# Patient Record
Sex: FEMALE | Race: OTHER | HISPANIC OR LATINO
[De-identification: names, ages, dates, MRNs, and addresses within clinical notes are randomized per-mention and may not be internally consistent; named-entity substitution may affect disease eponyms.]

---

## 2018-05-24 PROBLEM — Z00.00 ENCOUNTER FOR PREVENTIVE HEALTH EXAMINATION: Status: ACTIVE | Noted: 2018-05-24

## 2020-11-12 ENCOUNTER — TRANSCRIPTION ENCOUNTER (OUTPATIENT)
Age: 35
End: 2020-11-12

## 2020-11-12 ENCOUNTER — APPOINTMENT (OUTPATIENT)
Dept: ORTHOPEDIC SURGERY | Facility: CLINIC | Age: 35
End: 2020-11-12
Payer: MEDICAID

## 2020-11-12 VITALS — DIASTOLIC BLOOD PRESSURE: 58 MMHG | HEART RATE: 77 BPM | SYSTOLIC BLOOD PRESSURE: 103 MMHG

## 2020-11-12 VITALS — HEIGHT: 61 IN | WEIGHT: 125 LBS | BODY MASS INDEX: 23.6 KG/M2

## 2020-11-12 DIAGNOSIS — S89.91XA UNSPECIFIED INJURY OF RIGHT LOWER LEG, INITIAL ENCOUNTER: ICD-10-CM

## 2020-11-12 PROCEDURE — 73564 X-RAY EXAM KNEE 4 OR MORE: CPT | Mod: RT

## 2020-11-12 PROCEDURE — 99072 ADDL SUPL MATRL&STAF TM PHE: CPT

## 2020-11-12 PROCEDURE — 99204 OFFICE O/P NEW MOD 45 MIN: CPT

## 2020-11-12 NOTE — HISTORY OF PRESENT ILLNESS
[de-identified] : Anita is a 35F who presents with right knee pain. The pain began on 11/10 after she tripped while descending a flight of stairs.  She felt a pop and was unable to bear weight immediately after.  She was seen in an urgent care facility where xrays were reportedly negative.  She initially had swelling, but has been icing and wrapping the knee and the swelling has decreased.  Pain is worse with walking, better with elevation.  Reports sensation of instabilty.  Denies locking. No prior knee injury.

## 2020-11-12 NOTE — DISCUSSION/SUMMARY
[de-identified] : Discussed findings of today's exam and possible causes of patient's pain.  Educated patient on their most probable diagnosis of MCL tear with possibility of meniscal injury.  Reviewed possible courses of treatment, and we collaboratively decided best course of treatment at this time will include use of a brace and MRI.  Once the MRI is done, we will review to discuss further treatment.\par \par Shefali Whitfield MD, EdM\par [facility]\par Department of Orthopedics\par \par

## 2020-11-12 NOTE — PHYSICAL EXAM
[de-identified] : Constitutional: Well-nourished, well-developed, No acute distress\par Respiratory:  Good respiratory effort, no SOB\par Lymphatic: No regional lymphadenopathy, no lymphedema\par Psychiatric: Pleasant and normal affect, alert and oriented x3\par Skin: Clean dry and intact B/L UE/LE\par Musculoskeletal: normal except where as noted in regional exam\par \par \par right Knee:\par APPEARANCE: no marked deformities, mild effusion\par POSITIVE TENDERNESS:  medial joint, MCL, lateral joint\par ROM: lacks 10 degrees ext, 30 deg flexion\par SPECIAL TESTS: +laxity with valgus stress,. painless grind. neg Lachman's. neg ant/post drawer. + Richard's. \par NEURO: Normal sensation of LE, DTRs 2+/4 patella and achilles\par PULSES: 2+ DP/PT pulses\par B/L Hips: No asymmetry, malalignment, or swelling, Full ROM, 5/5 strength in flexion/ext, IR/ER, Abd/Add, Joints stable\par B/L Ankles: No asymmetry, malalignment, or swelling, Full ROM, 5/5 strength in DF/PF/Inv/Ev, Joints stable\par \par \par  [de-identified] : \par The following radiographs were ordered and read by me during this patient's visit. I reviewed each radiograph in detail with the patient and discussed the findings as highlighted below. \par \par 4 views of the right knee were obtained today that show no fracture, or dislocation. There are no degenerative changes seen. There is no malalignment. No obvious osseous abnormality. Otherwise unremarkable.\par

## 2020-11-18 ENCOUNTER — APPOINTMENT (OUTPATIENT)
Dept: MRI IMAGING | Facility: CLINIC | Age: 35
End: 2020-11-18
Payer: MEDICAID

## 2020-11-18 ENCOUNTER — OUTPATIENT (OUTPATIENT)
Dept: OUTPATIENT SERVICES | Facility: HOSPITAL | Age: 35
LOS: 1 days | End: 2020-11-18
Payer: MEDICAID

## 2020-11-18 DIAGNOSIS — S89.91XA UNSPECIFIED INJURY OF RIGHT LOWER LEG, INITIAL ENCOUNTER: ICD-10-CM

## 2020-11-18 PROCEDURE — 73721 MRI JNT OF LWR EXTRE W/O DYE: CPT | Mod: 26,RT

## 2020-11-18 PROCEDURE — 73721 MRI JNT OF LWR EXTRE W/O DYE: CPT

## 2020-11-19 ENCOUNTER — APPOINTMENT (OUTPATIENT)
Dept: ORTHOPEDIC SURGERY | Facility: CLINIC | Age: 35
End: 2020-11-19
Payer: MEDICAID

## 2020-11-19 PROCEDURE — 99442: CPT

## 2020-12-04 ENCOUNTER — APPOINTMENT (OUTPATIENT)
Dept: ORTHOPEDIC SURGERY | Facility: CLINIC | Age: 35
End: 2020-12-04
Payer: MEDICAID

## 2020-12-04 VITALS
HEIGHT: 61 IN | DIASTOLIC BLOOD PRESSURE: 85 MMHG | SYSTOLIC BLOOD PRESSURE: 121 MMHG | HEART RATE: 75 BPM | BODY MASS INDEX: 23.6 KG/M2 | OXYGEN SATURATION: 97 % | WEIGHT: 125 LBS

## 2020-12-04 PROCEDURE — 99072 ADDL SUPL MATRL&STAF TM PHE: CPT

## 2020-12-04 PROCEDURE — 99214 OFFICE O/P EST MOD 30 MIN: CPT

## 2020-12-04 RX ORDER — DICLOFENAC SODIUM 50 MG/1
50 TABLET, DELAYED RELEASE ORAL TWICE DAILY
Qty: 60 | Refills: 0 | Status: ACTIVE | COMMUNITY
Start: 2020-12-04 | End: 1900-01-01

## 2020-12-04 NOTE — PHYSICAL EXAM
[de-identified] : Physical Examination\par General: well nourished, in no acute distress, alert and oriented to person, place and time\par Psychiatric: normal mood and affect, no abnormal movements or speech patterns\par Eyes: vision intact - glasses\par Throat: no thyromegaly\par Lymph: no enlarged nodes, no lymphedema in extremity\par Respiratory: no wheezing, no shortness of breath with ambulation\par Cardiac: no cardiac leg swelling, 2+ peripheral pulses\par Neurology: normal gross sensation in extremities to light touch\par Abdomen: soft, non-tender, tympanic, no masses\par \par Musculoskeletal Examination\par Ambulation	+ antalgic gait, - assistive devices\par \par Knee			Right			Left\par General\par      Swelling/Deformity	normal			normal	\par      Skin			normal			normal\par      Erythema		-			-\par      Standing Alignment	neutral			neutral\par      Effusion		small			none\par Range of Motion\par      Hip			full painless ROM		full painless ROM\par      Knee Flexion		130			130\par      Knee Extension	0			0\par Patella\par      J Sign		-			-\par      Quad Medial/Lateral	1/1 1/1\par      Apprehension		-			-\par      Juan Pablo's		-			-\par      Grind Sign		-			-\par      Crepitus		-			-\par Palpation\par      Medial Joint Line	-			-\par      Medial Fem Condyle	+ epicondyle			-\par      Lateral Joint Line	-			-\par      Quad Tendon		-			-\par      Patella Tendon	-			-\par      Medial Patella		-			-\par      Lateral Patella 	-			-\par      Posterior Knee	-			-\par Ligamentous\par      Varus @ 0° / 30°	-/-			-/-\par      Valgus @ 0° / 30°	-/-			-/-\par      Lachman		+			-\par      Pivot Shift		+			-\par      Anterior Drawer	-			-\par      Posterior Drawer	-			-\par Meniscus\par      Joan		-			-\par      Flexion Pinch		-			-\par Strength Examination/Atrophy\par      Hip Flexors 		5+			5+\par      Quadriceps		5+			5+\par      Hamstring		5+			5+\par      Tibialis Anterior	5+			5+\par      Achilles/Soleus	5+			5+\par Sensation\par      Deep Peroneal	normal			normal\par      Superficial Peroneal 	normal			normal\par      Sural  		normal			normal\par      Posterior Tibial 	normal			normal\par      Saphneous 		normal			normal\par Pulses\par      DP			2+			2+\par  [de-identified] : \par MRI Right knee from [facility] on 11-19-20\par My impression of the images:\par Quality of the MRI is ok\par Medial Meniscus ok\par Lateral Meniscus ok\par There is no chondral loss in the tri compartments\par There is bone marrow edema in the lateral and medial compartments\par LCL [Is] intact\par MCL [Is] intact, signal proximal origin\par ACL is not intact, torn proximal origin\par PCL [Is] intact \par Quadriceps Tendon [Is] intact\par Patella Tendon [Is] intact\par \par The Final Radiologist Impression:\par CRUCIATE AND COLLATERAL LIGAMENTS: There is a full-thickness tear of the anterior cruciate ligament. The remnant fibers are ill-defined and amorphus. There is periligamentous edema about the medial collateral ligament consistent with a mild sprain. No focal tear is demonstrated. Edema extends along the posterior medial capsule. The posterior oblique ligament is edematous and amorphus consistent with a moderate sprain. There is mild to moderate tendinosis of the semimembranosus insertion. The lateral collateral ligamentous structures are intact.\par \par MEDIAL COMPARTMENT: The medial meniscus is intact. Preserved articular cartilage.\par \par LATERAL COMPARTMENT: The lateral meniscus is intact. Preserved articular cartilage.\par \par PATELLOFEMORAL COMPARTMENT: Mild superficial fissuring of the articular cartilage of the lateral facet of the patella and of the trochlea. The patella demonstrates slight lateral deviation.\par \par EXTENSOR MECHANISM: Patellar and quadriceps tendons are intact.\par \par SYNOVIUM/ JOINT FLUID: Small knee effusion. A small amount of fluid extends into the gastrocnemius and semimembranosus bursa where there is seepage of fluid into the surrounding myofascial planes.\par \par BONE MARROW: There is a prominent osseous contusion involving the posterior aspect lateral tibial plateau. No reciprocal contusion is seen along the lateral femoral condyle..\par \par MUSCLES: Within normal limits.\par \par NEUROVASCULAR STRUCTURES: Intact.\par \par SUBCUTANEOUS SOFT TISSUES: Within normal limits.\par \par IMPRESSION:\par 1. Full-thickness tear of the anterior cruciate ligament.\par 2. Prominent contusion of the lateral tibial plateau.\par 3. Mild sprain of the medial collateral ligament without focal discontinuity. Moderate sprain of the posterior oblique ligament along the posterior medial joint capsule.\par \par

## 2020-12-04 NOTE — DISCUSSION/SUMMARY
[de-identified] : Rt knee ACL rupture\par \par A lengthy discussion was carried out with the patient regarding my findings. I have reviewed the MRI and report again with the patient. I have again discussed the treatment options. Our plan is to proceed with surgical arthroscopy of the left knee with anterior cruciate ligament reconstruction, with tibialis allograft careful evaluation of any meniscal pathology with any partial meniscectomy versus meniscal repair, chondroplasty and debridement. I have explained to the patient the nature of the surgery as well as the perioperative recovery including the risks, benefits and alternatives. The risks, benefits and alternatives to surgery were discussed. The risks included, but were not limited to, infection, bleeding, damage to the vessels and nerves, loss of motion, continued pain, future tearing of the meniscus, deep venous thrombosis, RSD, avascular necrosis, development of degenerative arthrosis, rupture of the anterior cruciate ligament, and complications due to general and regional anesthesia including nerve injury, myocardial infarction, stroke and death. The patient stated understanding of the nature of the surgical procedure and gave written and verbal consent to proceed. The patient's questions were answered. \par \par will plan surgery dec 21 hamstring allograft\par \par fu 1-2 weeks postop

## 2020-12-04 NOTE — HISTORY OF PRESENT ILLNESS
[de-identified] : 35 year old female presents today for right ACL tear, diagnosed on MRI 11-19-20, referred from Dr. Whitfield. She tripped while descending a flight of stairs on 11-10-20.  She felt a pop and was unable to bear weight immediately after. She was seen in an urgent care facility where xrays were reportedly negative. She initially had swelling, but has been icing and wrapping the knee and the swelling has decreased. Pain is worse with walking, better with elevation. Reports sensation of instabilty. Denies locking. No prior knee injury. Pt would like to proceed with surgery at this time.

## 2020-12-17 ENCOUNTER — OUTPATIENT (OUTPATIENT)
Dept: OUTPATIENT SERVICES | Facility: HOSPITAL | Age: 35
LOS: 1 days | End: 2020-12-17
Payer: MEDICAID

## 2020-12-17 VITALS
DIASTOLIC BLOOD PRESSURE: 80 MMHG | OXYGEN SATURATION: 98 % | TEMPERATURE: 99 F | RESPIRATION RATE: 16 BRPM | HEART RATE: 80 BPM | SYSTOLIC BLOOD PRESSURE: 112 MMHG | HEIGHT: 62 IN | WEIGHT: 136.91 LBS

## 2020-12-17 DIAGNOSIS — S83.519A SPRAIN OF ANTERIOR CRUCIATE LIGAMENT OF UNSPECIFIED KNEE, INITIAL ENCOUNTER: ICD-10-CM

## 2020-12-17 DIAGNOSIS — S83.419A SPRAIN OF MEDIAL COLLATERAL LIGAMENT OF UNSPECIFIED KNEE, INITIAL ENCOUNTER: ICD-10-CM

## 2020-12-17 DIAGNOSIS — Z01.818 ENCOUNTER FOR OTHER PREPROCEDURAL EXAMINATION: ICD-10-CM

## 2020-12-17 DIAGNOSIS — Z98.890 OTHER SPECIFIED POSTPROCEDURAL STATES: Chronic | ICD-10-CM

## 2020-12-17 DIAGNOSIS — S83.511A SPRAIN OF ANTERIOR CRUCIATE LIGAMENT OF RIGHT KNEE, INITIAL ENCOUNTER: ICD-10-CM

## 2020-12-17 LAB
ANION GAP SERPL CALC-SCNC: 9 MMOL/L — SIGNIFICANT CHANGE UP (ref 5–17)
BUN SERPL-MCNC: 11 MG/DL — SIGNIFICANT CHANGE UP (ref 7–23)
CALCIUM SERPL-MCNC: 9.6 MG/DL — SIGNIFICANT CHANGE UP (ref 8.4–10.5)
CHLORIDE SERPL-SCNC: 104 MMOL/L — SIGNIFICANT CHANGE UP (ref 96–108)
CO2 SERPL-SCNC: 27 MMOL/L — SIGNIFICANT CHANGE UP (ref 22–31)
CREAT SERPL-MCNC: 0.86 MG/DL — SIGNIFICANT CHANGE UP (ref 0.5–1.3)
GLUCOSE SERPL-MCNC: 76 MG/DL — SIGNIFICANT CHANGE UP (ref 70–99)
HCT VFR BLD CALC: 39.7 % — SIGNIFICANT CHANGE UP (ref 34.5–45)
HGB BLD-MCNC: 13 G/DL — SIGNIFICANT CHANGE UP (ref 11.5–15.5)
MCHC RBC-ENTMCNC: 29.7 PG — SIGNIFICANT CHANGE UP (ref 27–34)
MCHC RBC-ENTMCNC: 32.7 GM/DL — SIGNIFICANT CHANGE UP (ref 32–36)
MCV RBC AUTO: 90.8 FL — SIGNIFICANT CHANGE UP (ref 80–100)
NRBC # BLD: 0 /100 WBCS — SIGNIFICANT CHANGE UP (ref 0–0)
PLATELET # BLD AUTO: 240 K/UL — SIGNIFICANT CHANGE UP (ref 150–400)
POTASSIUM SERPL-MCNC: 4 MMOL/L — SIGNIFICANT CHANGE UP (ref 3.5–5.3)
POTASSIUM SERPL-SCNC: 4 MMOL/L — SIGNIFICANT CHANGE UP (ref 3.5–5.3)
RBC # BLD: 4.37 M/UL — SIGNIFICANT CHANGE UP (ref 3.8–5.2)
RBC # FLD: 13.1 % — SIGNIFICANT CHANGE UP (ref 10.3–14.5)
SODIUM SERPL-SCNC: 140 MMOL/L — SIGNIFICANT CHANGE UP (ref 135–145)
WBC # BLD: 7.97 K/UL — SIGNIFICANT CHANGE UP (ref 3.8–10.5)
WBC # FLD AUTO: 7.97 K/UL — SIGNIFICANT CHANGE UP (ref 3.8–10.5)

## 2020-12-17 PROCEDURE — 80048 BASIC METABOLIC PNL TOTAL CA: CPT

## 2020-12-17 PROCEDURE — G0463: CPT

## 2020-12-17 PROCEDURE — 85027 COMPLETE CBC AUTOMATED: CPT

## 2020-12-17 RX ORDER — CHLORHEXIDINE GLUCONATE 213 G/1000ML
1 SOLUTION TOPICAL ONCE
Refills: 0 | Status: DISCONTINUED | OUTPATIENT
Start: 2020-12-21 | End: 2021-01-04

## 2020-12-17 RX ORDER — SODIUM CHLORIDE 9 MG/ML
3 INJECTION INTRAMUSCULAR; INTRAVENOUS; SUBCUTANEOUS EVERY 8 HOURS
Refills: 0 | Status: DISCONTINUED | OUTPATIENT
Start: 2020-12-21 | End: 2021-01-04

## 2020-12-17 RX ORDER — LIDOCAINE HCL 20 MG/ML
0.2 VIAL (ML) INJECTION ONCE
Refills: 0 | Status: DISCONTINUED | OUTPATIENT
Start: 2020-12-21 | End: 2021-01-04

## 2020-12-17 NOTE — H&P PST ADULT - NSANTHOSAYNRD_GEN_A_CORE
No. BRYNN screening performed.  STOP BANG Legend: 0-2 = LOW Risk; 3-4 = INTERMEDIATE Risk; 5-8 = HIGH Risk
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
## 2020-12-17 NOTE — H&P PST ADULT - HISTORY OF PRESENT ILLNESS
36 yo F with h/o Chiari malformation s/p brain decompression(2010, last neuro eval in 2016) c/o right knee pain since 11/10/20  2/2 tripping from a flight of strairs . Pt had orthopedic consult- s/p MRI revealed sprain ACL right knee scheduled for right knee ACL reconstruction on 12/21/20.  *covid 19 PCR on 12/18/20

## 2020-12-17 NOTE — H&P PST ADULT - NSICDXPROBLEM_GEN_ALL_CORE_FT
PROBLEM DIAGNOSES  Problem: Right ACL tear  Assessment and Plan: Right ACL reconstruction  Labs- CBC, BMP  Pre op instruction discussed

## 2020-12-17 NOTE — H&P PST ADULT - NEGATIVE NEUROLOGICAL SYMPTOMS
no transient paralysis/no weakness/no paresthesias/no generalized seizures/no focal seizures/no difficulty walking/no headache

## 2020-12-18 ENCOUNTER — OUTPATIENT (OUTPATIENT)
Dept: OUTPATIENT SERVICES | Facility: HOSPITAL | Age: 35
LOS: 1 days | End: 2020-12-18
Payer: MEDICAID

## 2020-12-18 DIAGNOSIS — Z20.828 CONTACT WITH AND (SUSPECTED) EXPOSURE TO OTHER VIRAL COMMUNICABLE DISEASES: ICD-10-CM

## 2020-12-18 DIAGNOSIS — Z98.890 OTHER SPECIFIED POSTPROCEDURAL STATES: Chronic | ICD-10-CM

## 2020-12-18 DIAGNOSIS — Z11.59 ENCOUNTER FOR SCREENING FOR OTHER VIRAL DISEASES: ICD-10-CM

## 2020-12-18 LAB — SARS-COV-2 RNA SPEC QL NAA+PROBE: SIGNIFICANT CHANGE UP

## 2020-12-18 PROCEDURE — U0003: CPT

## 2020-12-20 ENCOUNTER — TRANSCRIPTION ENCOUNTER (OUTPATIENT)
Age: 35
End: 2020-12-20

## 2020-12-21 ENCOUNTER — OUTPATIENT (OUTPATIENT)
Dept: OUTPATIENT SERVICES | Facility: HOSPITAL | Age: 35
LOS: 1 days | End: 2020-12-21
Payer: MEDICAID

## 2020-12-21 ENCOUNTER — APPOINTMENT (OUTPATIENT)
Dept: ORTHOPEDIC SURGERY | Facility: HOSPITAL | Age: 35
End: 2020-12-21

## 2020-12-21 VITALS
TEMPERATURE: 97 F | OXYGEN SATURATION: 98 % | DIASTOLIC BLOOD PRESSURE: 62 MMHG | HEART RATE: 66 BPM | HEIGHT: 62 IN | SYSTOLIC BLOOD PRESSURE: 97 MMHG | RESPIRATION RATE: 16 BRPM | WEIGHT: 136.91 LBS

## 2020-12-21 VITALS
HEART RATE: 91 BPM | DIASTOLIC BLOOD PRESSURE: 59 MMHG | SYSTOLIC BLOOD PRESSURE: 109 MMHG | TEMPERATURE: 98 F | RESPIRATION RATE: 15 BRPM | OXYGEN SATURATION: 97 %

## 2020-12-21 DIAGNOSIS — Z01.818 ENCOUNTER FOR OTHER PREPROCEDURAL EXAMINATION: ICD-10-CM

## 2020-12-21 DIAGNOSIS — Z98.890 OTHER SPECIFIED POSTPROCEDURAL STATES: Chronic | ICD-10-CM

## 2020-12-21 DIAGNOSIS — S83.519A SPRAIN OF ANTERIOR CRUCIATE LIGAMENT OF UNSPECIFIED KNEE, INITIAL ENCOUNTER: ICD-10-CM

## 2020-12-21 DIAGNOSIS — S83.419A SPRAIN OF MEDIAL COLLATERAL LIGAMENT OF UNSPECIFIED KNEE, INITIAL ENCOUNTER: ICD-10-CM

## 2020-12-21 PROCEDURE — 29888 ARTHRS AID ACL RPR/AGMNTJ: CPT | Mod: RT

## 2020-12-21 PROCEDURE — C1713: CPT

## 2020-12-21 PROCEDURE — C1769: CPT

## 2020-12-21 PROCEDURE — C1889: CPT

## 2020-12-21 RX ORDER — ESCITALOPRAM OXALATE 10 MG/1
2 TABLET, FILM COATED ORAL
Qty: 0 | Refills: 0 | DISCHARGE

## 2020-12-21 RX ORDER — DOCUSATE SODIUM 100 MG/1
100 CAPSULE, LIQUID FILLED ORAL TWICE DAILY
Qty: 50 | Refills: 0 | Status: COMPLETED | COMMUNITY
Start: 2020-12-21 | End: 2021-01-15

## 2020-12-21 RX ORDER — CEPHALEXIN 500 MG/1
500 TABLET ORAL
Qty: 9 | Refills: 0 | Status: ACTIVE | COMMUNITY
Start: 2020-12-21 | End: 1900-01-01

## 2020-12-21 RX ORDER — ASPIRIN 325 MG/1
325 TABLET, FILM COATED ORAL
Qty: 28 | Refills: 0 | Status: COMPLETED | COMMUNITY
Start: 2020-12-21 | End: 2021-01-20

## 2020-12-21 RX ORDER — GABAPENTIN 400 MG/1
2 CAPSULE ORAL
Qty: 0 | Refills: 0 | DISCHARGE

## 2020-12-21 RX ORDER — ONDANSETRON 8 MG/1
4 TABLET, FILM COATED ORAL ONCE
Refills: 0 | Status: COMPLETED | OUTPATIENT
Start: 2020-12-21 | End: 2020-12-21

## 2020-12-21 RX ORDER — METOCLOPRAMIDE HCL 10 MG
10 TABLET ORAL ONCE
Refills: 0 | Status: DISCONTINUED | OUTPATIENT
Start: 2020-12-21 | End: 2021-01-04

## 2020-12-21 RX ORDER — OXYCODONE HYDROCHLORIDE 5 MG/1
5 TABLET ORAL ONCE
Refills: 0 | Status: DISCONTINUED | OUTPATIENT
Start: 2020-12-21 | End: 2020-12-21

## 2020-12-21 RX ORDER — HYDROMORPHONE HYDROCHLORIDE 2 MG/ML
0.5 INJECTION INTRAMUSCULAR; INTRAVENOUS; SUBCUTANEOUS
Refills: 0 | Status: DISCONTINUED | OUTPATIENT
Start: 2020-12-21 | End: 2020-12-21

## 2020-12-21 RX ORDER — OXYCODONE AND ACETAMINOPHEN 5; 325 MG/1; MG/1
5-325 TABLET ORAL
Qty: 56 | Refills: 0 | Status: ACTIVE | COMMUNITY
Start: 2020-12-21 | End: 1900-01-01

## 2020-12-21 RX ORDER — ACETAMINOPHEN 500 MG
1000 TABLET ORAL ONCE
Refills: 0 | Status: DISCONTINUED | OUTPATIENT
Start: 2020-12-21 | End: 2020-12-21

## 2020-12-21 RX ORDER — STANDARDIZED SENNA CONCENTRATE 8.6 MG/1
8.6 TABLET ORAL
Qty: 30 | Refills: 0 | Status: ACTIVE | COMMUNITY
Start: 2020-12-21 | End: 1900-01-01

## 2020-12-21 RX ORDER — TRAZODONE HCL 50 MG
1 TABLET ORAL
Qty: 0 | Refills: 0 | DISCHARGE

## 2020-12-21 RX ADMIN — HYDROMORPHONE HYDROCHLORIDE 0.5 MILLIGRAM(S): 2 INJECTION INTRAMUSCULAR; INTRAVENOUS; SUBCUTANEOUS at 18:05

## 2020-12-21 RX ADMIN — HYDROMORPHONE HYDROCHLORIDE 0.5 MILLIGRAM(S): 2 INJECTION INTRAMUSCULAR; INTRAVENOUS; SUBCUTANEOUS at 18:00

## 2020-12-21 RX ADMIN — ONDANSETRON 4 MILLIGRAM(S): 8 TABLET, FILM COATED ORAL at 17:45

## 2020-12-21 RX ADMIN — OXYCODONE HYDROCHLORIDE 5 MILLIGRAM(S): 5 TABLET ORAL at 17:45

## 2020-12-21 RX ADMIN — HYDROMORPHONE HYDROCHLORIDE 0.5 MILLIGRAM(S): 2 INJECTION INTRAMUSCULAR; INTRAVENOUS; SUBCUTANEOUS at 17:45

## 2020-12-21 RX ADMIN — OXYCODONE HYDROCHLORIDE 5 MILLIGRAM(S): 5 TABLET ORAL at 18:15

## 2020-12-21 NOTE — ASU DISCHARGE PLAN (ADULT/PEDIATRIC) - NURSING INSTRUCTIONS
******************************************************************************************  Next dose of TYLENOL may be taken at or after _______11:00______ PM if needed. DO NOT take any additional products containing TYLENOL or ACETAMINOPHEN, such as VICODIN, PERCOCET, NORCO, EXCEDRIN, and any over-the-counter cold medications until this time. DO NOT CONSUME MORE THAN 1116-1602 MG OF TYLENOL (acetaminophen) in a 24-hour period.   ****************************************************************************************** ***********************************************************************************************  Next dose of TYLENOL may be taken at or after _______11:00______ PM if needed. DO NOT take any additional products containing TYLENOL or ACETAMINOPHEN, such as VICODIN, PERCOCET, NORCO, EXCEDRIN, and any over-the-counter cold medications until this time. DO NOT CONSUME MORE THAN 6964-5387 MG OF TYLENOL (acetaminophen) in a 24-hour period.   ***********************************************************************************************
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
## 2020-12-21 NOTE — ASU DISCHARGE PLAN (ADULT/PEDIATRIC) - CARE PROVIDER_API CALL
Ibrahima Varma  ORTHOPAEDIC SURGERY  [address], [address] [place] [address]  Phone: [phone]  Fax: [phone]  Follow Up Time:

## 2020-12-29 PROBLEM — F41.9 ANXIETY DISORDER, UNSPECIFIED: Chronic | Status: ACTIVE | Noted: 2020-12-17
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
## 2020-12-29 PROBLEM — Z86.69 PERSONAL HISTORY OF OTHER DISEASES OF THE NERVOUS SYSTEM AND SENSE ORGANS: Chronic | Status: ACTIVE | Noted: 2020-12-17

## 2021-01-08 ENCOUNTER — APPOINTMENT (OUTPATIENT)
Dept: ORTHOPEDIC SURGERY | Facility: CLINIC | Age: 36
End: 2021-01-08
Payer: MEDICAID

## 2021-01-08 VITALS — DIASTOLIC BLOOD PRESSURE: 75 MMHG | SYSTOLIC BLOOD PRESSURE: 116 MMHG

## 2021-01-08 PROCEDURE — 99024 POSTOP FOLLOW-UP VISIT: CPT

## 2021-01-08 PROCEDURE — 73560 X-RAY EXAM OF KNEE 1 OR 2: CPT | Mod: RT

## 2021-01-08 NOTE — HISTORY OF PRESENT ILLNESS
[de-identified] : Patient is status post Right knee Arthroscopy on 12-21-20\par Arthroscopic ACL reconstruction with hamstring allograft size 9\par \par \par The patient is doing well with improved pain postoperatively, is only occasional taking narcotics for pain in the evening.\par They have been doing postoperative icing, elevation, compressive dressing and exercises as directed with improving swelling, pain and motion.\par They are taking ASA as directed for postoperative DVT ppx\par \par The patient denies shortness of breath, chest pain, numbness tingling, worsening calf pain or swelling.\par \par Right Lower Ext\par \par Dressing intact\par Steri-Strips intact\par Incisions are intact\par There is no erythema induration warmth or tenderness about the incisions\par There is trace effusion\par Knee ROM is from 0-90 painless\par Motor is intact knee/ankle/toe flexor/extensor\par Sensory intact deep+superficial peroneal/posterior tibial/sural/saphenous\par Palpable DP with brisk capillary refill\par Mild quadriceps muscle weakness and decreased tone\par \par 2 views of the affected Right knee (ap lateral view)\par were ordered, obtained and evaluated by myself today and\par demonstrate:\par sp acl with tibial tunnel and screw in good position\par femoral tunnel and button in good position\par \par Assessment Plan\par 2 weeks status post Right knee Arthroscopy on 12-21-20\par Arthroscopic ACL reconstruction with hamstring allograft size 9\par \par Steristrips removed and changed sterile\par \par Continue posteroperative exercises\par Continue compressive dressing and ice for pain and swelling\par \par Continue WBAT RLE\par \par Begin Physical Therapy with Prescription and Protocol Provided\par Return to activities gradually as tolerated\par \par Continue shower, bathing w submersion of incision ok at 2 weeks\par \par Continue ASA DVT ppx for 1 month\par \par Surgery and arthroscopic images reviewed and provided for patient\par \par Follow up:\par 4 weeks\par

## 2021-02-05 ENCOUNTER — APPOINTMENT (OUTPATIENT)
Dept: ORTHOPEDIC SURGERY | Facility: CLINIC | Age: 36
End: 2021-02-05
Payer: MEDICAID

## 2021-02-05 PROCEDURE — 99024 POSTOP FOLLOW-UP VISIT: CPT

## 2021-02-05 NOTE — HISTORY OF PRESENT ILLNESS
[de-identified] : Patient is status post Right knee Arthroscopy on 12-21-20\par Arthroscopic ACL reconstruction with hamstring allograft size 9\par \par \par The patient is doing well with minimal pain; quad feels weak\par \par The patient denies shortness of breath, chest pain, numbness tingling, worsening calf pain or swelling.\par \par Right Lower Ext\par \par Incisions are intact\par There is no erythema induration warmth or tenderness about the incisions\par There is trace effusion\par Knee ROM is from 0-130 painless\par Motor is intact knee/ankle/toe flexor/extensor\par Sensory intact deep+superficial peroneal/posterior tibial/sural/saphenous\par Palpable DP with brisk capillary refill\par Mild quadriceps muscle weakness and decreased tone\par \par 2 views of the affected Right knee (ap lateral view)\par 1-8-2021\par demonstrate:\par sp acl with tibial tunnel and screw in good position\par femoral tunnel and button in good position\par \par Assessment Plan\par 6 weeks status post Right knee Arthroscopy on 12-21-20\par Arthroscopic ACL reconstruction with hamstring allograft size 9\par \par Continue Physical Therapy with Prescription and Protocol Provided\par Return to activities gradually as tolerated\par \par Follow Up\par 6 weeks

## 2021-03-23 ENCOUNTER — APPOINTMENT (OUTPATIENT)
Dept: ORTHOPEDIC SURGERY | Facility: CLINIC | Age: 36
End: 2021-03-23
Payer: MEDICAID

## 2021-03-23 DIAGNOSIS — S83.419A SPRAIN OF MEDIAL COLLATERAL LIGAMENT OF UNSPECIFIED KNEE, INITIAL ENCOUNTER: ICD-10-CM

## 2021-03-23 DIAGNOSIS — S83.519A SPRAIN OF ANTERIOR CRUCIATE LIGAMENT OF UNSPECIFIED KNEE, INITIAL ENCOUNTER: ICD-10-CM

## 2021-03-23 PROCEDURE — 99072 ADDL SUPL MATRL&STAF TM PHE: CPT

## 2021-03-23 PROCEDURE — 99213 OFFICE O/P EST LOW 20 MIN: CPT

## 2021-03-23 NOTE — DISCUSSION/SUMMARY
[de-identified] : 12 weeks status post Right knee Arthroscopy on 12-21-20\par Arthroscopic ACL reconstruction with hamstring allograft size 9\par \par Continue Physical Therapy\par \par doing well\par work on quad strengthening\par \par Follow Up\par 3 months\par

## 2021-03-23 NOTE — HISTORY OF PRESENT ILLNESS
[de-identified] : Patient is status post Right knee Arthroscopy on 12-21-20\par Arthroscopic ACL reconstruction with hamstring allograft size 9\par \par The patient is doing well with no pain; quad feels minimally weak, otherwise feels great\par \par The patient denies shortness of breath, chest pain, numbness tingling, worsening calf pain or swelling.

## 2021-03-23 NOTE — PHYSICAL EXAM
[de-identified] : Physical Examination\par General: well nourished, in no acute distress, alert and oriented to person, place and time\par Psychiatric: normal mood and affect, no abnormal movements or speech patterns\par Eyes: vision intact - glasses\par Throat: no thyromegaly\par Lymph: no enlarged nodes, no lymphedema in extremity\par Respiratory: no wheezing, no shortness of breath with ambulation\par Cardiac: no cardiac leg swelling, 2+ peripheral pulses\par Neurology: normal gross sensation in extremities to light touch\par Abdomen: soft, non-tender, tympanic, no masses\par \par Musculoskeletal Examination\par Ambulation	- antalgic gait, - assistive devices\par \par Knee			Right			Left\par General\par      Swelling/Deformity	normal			normal	\par      Skin			healed incisions			normal\par      Erythema		-			-\par      Standing Alignment	neutral			neutral\par      Effusion		trace			none\par Range of Motion\par      Hip			full painless ROM		full painless ROM\par      Knee Flexion		130			130\par      Knee Extension	0			-5\par Patella\par      J Sign		-			-\par      Quad Medial/Lateral	1/1 1/1\par      Apprehension		-			-\par      Juan Pablo's		-			-\par      Grind Sign		-			-\par      Crepitus		-			-\par Palpation\par      Medial Joint Line	-			-\par      Medial Fem Condyle	-			-\par      Lateral Joint Line	-			-\par      Quad Tendon		-			-\par      Patella Tendon	-			-\par      Medial Patella		-			-\par      Lateral Patella 	-			-\par      Posterior Knee	-			-\par Ligamentous\par      Varus @ 0° / 30°	-/-			-/-\par      Valgus @ 0° / 30°	-/-			-/-\par      Lachman		-			-\par      Pivot Shift		&			-\par      Anterior Drawer	-			-\par      Posterior Drawer	-			-\par Meniscus\par      Joan		-&			-\par      Flexion Pinch		-			-\par Strength Examination/Atrophy\par      Hip Flexors 		5+			5+\par      Quadriceps		5+			5+\par      Hamstring		5+			5+\par      Tibialis Anterior	5+			5+\par      Achilles/Soleus	5+			5+\par Sensation\par      Deep Peroneal	normal			normal\par      Superficial Peroneal 	normal			normal\par      Sural  		normal			normal\par      Posterior Tibial 	normal			normal\par      Saphneous 		normal			normal\par Pulses\par      DP			2+			2+\par  [de-identified] : \par MRI Right knee from [facility] on 11-19-20\par My impression of the images:\par Quality of the MRI is ok\par Medial Meniscus ok\par Lateral Meniscus ok\par There is no chondral loss in the tri compartments\par There is bone marrow edema in the lateral and medial compartments\par LCL [Is] intact\par MCL [Is] intact, signal proximal origin\par ACL is not intact, torn proximal origin\par PCL [Is] intact \par Quadriceps Tendon [Is] intact\par Patella Tendon [Is] intact\par \par The Final Radiologist Impression:\par CRUCIATE AND COLLATERAL LIGAMENTS: There is a full-thickness tear of the anterior cruciate ligament. The remnant fibers are ill-defined and amorphus. There is periligamentous edema about the medial collateral ligament consistent with a mild sprain. No focal tear is demonstrated. Edema extends along the posterior medial capsule. The posterior oblique ligament is edematous and amorphus consistent with a moderate sprain. There is mild to moderate tendinosis of the semimembranosus insertion. The lateral collateral ligamentous structures are intact.\par \par MEDIAL COMPARTMENT: The medial meniscus is intact. Preserved articular cartilage.\par \par LATERAL COMPARTMENT: The lateral meniscus is intact. Preserved articular cartilage.\par \par PATELLOFEMORAL COMPARTMENT: Mild superficial fissuring of the articular cartilage of the lateral facet of the patella and of the trochlea. The patella demonstrates slight lateral deviation.\par \par EXTENSOR MECHANISM: Patellar and quadriceps tendons are intact.\par \par SYNOVIUM/ JOINT FLUID: Small knee effusion. A small amount of fluid extends into the gastrocnemius and semimembranosus bursa where there is seepage of fluid into the surrounding myofascial planes.\par \par BONE MARROW: There is a prominent osseous contusion involving the posterior aspect lateral tibial plateau. No reciprocal contusion is seen along the lateral femoral condyle..\par \par MUSCLES: Within normal limits.\par \par NEUROVASCULAR STRUCTURES: Intact.\par \par SUBCUTANEOUS SOFT TISSUES: Within normal limits.\par \par IMPRESSION:\par 1. Full-thickness tear of the anterior cruciate ligament.\par 2. Prominent contusion of the lateral tibial plateau.\par 3. Mild sprain of the medial collateral ligament without focal discontinuity. Moderate sprain of the posterior oblique ligament along the posterior medial joint capsule.\par \par \par 2 views of the affected Right knee (ap lateral view)\par 1-8-2021\par demonstrate:\par sp acl with tibial tunnel and screw in good position\par femoral tunnel and button in good position\par

## 2021-06-25 ENCOUNTER — APPOINTMENT (OUTPATIENT)
Dept: ORTHOPEDIC SURGERY | Facility: CLINIC | Age: 36
End: 2021-06-25
